# Patient Record
Sex: MALE
[De-identification: names, ages, dates, MRNs, and addresses within clinical notes are randomized per-mention and may not be internally consistent; named-entity substitution may affect disease eponyms.]

---

## 2020-10-13 PROBLEM — Z00.00 ENCOUNTER FOR PREVENTIVE HEALTH EXAMINATION: Status: ACTIVE | Noted: 2020-10-13

## 2021-01-12 ENCOUNTER — LABORATORY RESULT (OUTPATIENT)
Age: 61
End: 2021-01-12

## 2021-01-12 ENCOUNTER — APPOINTMENT (OUTPATIENT)
Dept: RHEUMATOLOGY | Facility: CLINIC | Age: 61
End: 2021-01-12
Payer: COMMERCIAL

## 2021-01-12 VITALS
TEMPERATURE: 98.2 F | SYSTOLIC BLOOD PRESSURE: 136 MMHG | RESPIRATION RATE: 16 BRPM | OXYGEN SATURATION: 96 % | HEART RATE: 72 BPM | DIASTOLIC BLOOD PRESSURE: 83 MMHG | BODY MASS INDEX: 31.98 KG/M2 | WEIGHT: 211 LBS | HEIGHT: 68 IN

## 2021-01-12 DIAGNOSIS — M25.50 PAIN IN UNSPECIFIED JOINT: ICD-10-CM

## 2021-01-12 DIAGNOSIS — Z80.9 FAMILY HISTORY OF MALIGNANT NEOPLASM, UNSPECIFIED: ICD-10-CM

## 2021-01-12 DIAGNOSIS — M77.11 LATERAL EPICONDYLITIS, RIGHT ELBOW: ICD-10-CM

## 2021-01-12 DIAGNOSIS — M54.5 LOW BACK PAIN: ICD-10-CM

## 2021-01-12 DIAGNOSIS — I10 ESSENTIAL (PRIMARY) HYPERTENSION: ICD-10-CM

## 2021-01-12 DIAGNOSIS — E78.00 PURE HYPERCHOLESTEROLEMIA, UNSPECIFIED: ICD-10-CM

## 2021-01-12 DIAGNOSIS — M77.12 LATERAL EPICONDYLITIS, RIGHT ELBOW: ICD-10-CM

## 2021-01-12 PROCEDURE — 99072 ADDL SUPL MATRL&STAF TM PHE: CPT

## 2021-01-12 PROCEDURE — 99205 OFFICE O/P NEW HI 60 MIN: CPT

## 2021-01-12 RX ORDER — CLARITHROMYCIN 500 MG/1
500 TABLET, FILM COATED ORAL
Qty: 28 | Refills: 0 | Status: ACTIVE | COMMUNITY
Start: 2020-11-12

## 2021-01-12 RX ORDER — TAMSULOSIN HYDROCHLORIDE 0.4 MG/1
0.4 CAPSULE ORAL
Qty: 30 | Refills: 0 | Status: ACTIVE | COMMUNITY
Start: 2020-12-31

## 2021-01-12 RX ORDER — ATORVASTATIN CALCIUM 10 MG/1
10 TABLET, FILM COATED ORAL
Qty: 30 | Refills: 0 | Status: ACTIVE | COMMUNITY
Start: 2021-01-09

## 2021-01-12 RX ORDER — OXYCODONE AND ACETAMINOPHEN 5; 325 MG/1; MG/1
5-325 TABLET ORAL
Qty: 8 | Refills: 0 | Status: ACTIVE | COMMUNITY
Start: 2020-07-14

## 2021-01-12 RX ORDER — AMOXICILLIN 500 MG/1
500 TABLET, FILM COATED ORAL
Qty: 56 | Refills: 0 | Status: ACTIVE | COMMUNITY
Start: 2020-11-12

## 2021-01-12 RX ORDER — ALLOPURINOL 300 MG/1
300 TABLET ORAL
Qty: 30 | Refills: 0 | Status: ACTIVE | COMMUNITY
Start: 2021-01-09

## 2021-01-12 RX ORDER — MELOXICAM 15 MG/1
15 TABLET ORAL
Qty: 30 | Refills: 0 | Status: ACTIVE | COMMUNITY
Start: 2020-08-18

## 2021-01-12 RX ORDER — POLYETHYLENE GLYCOL 3350 AND ELECTROLYTES WITH LEMON FLAVOR 236; 22.74; 6.74; 5.86; 2.97 G/4L; G/4L; G/4L; G/4L; G/4L
236 POWDER, FOR SOLUTION ORAL
Qty: 4000 | Refills: 0 | Status: ACTIVE | COMMUNITY
Start: 2020-10-05

## 2021-01-12 RX ORDER — AMLODIPINE BESYLATE 2.5 MG/1
2.5 TABLET ORAL
Qty: 30 | Refills: 0 | Status: ACTIVE | COMMUNITY
Start: 2020-12-31

## 2021-01-12 RX ORDER — ERGOCALCIFEROL 1.25 MG/1
1.25 MG CAPSULE, LIQUID FILLED ORAL
Qty: 4 | Refills: 0 | Status: ACTIVE | COMMUNITY
Start: 2020-10-07

## 2021-01-12 NOTE — PHYSICAL EXAM
[General Appearance - Alert] : alert [General Appearance - In No Acute Distress] : in no acute distress [Neck Appearance] : the appearance of the neck was normal [Auscultation Breath Sounds / Voice Sounds] : lungs were clear to auscultation bilaterally [Heart Rate And Rhythm] : heart rate was normal and rhythm regular [Edema] : there was no peripheral edema [No Spinal Tenderness] : no spinal tenderness [Motor Tone] : muscle strength and tone were normal [Musculoskeletal - Swelling] : no joint swelling seen [FreeTextEntry1] : No active tenosynovitis with appropriate shoulder abduction and lateral neck flexion. Tenderness appreciated over the anserine bursa bilaterally with mild tenderness over the medial joint lines bilaterally; external rotation of the hips maintained with tibiotalar motion is intact. [] : no rash [Oriented To Time, Place, And Person] : oriented to person, place, and time [Motor Exam] : the motor exam was normal [Impaired Insight] : insight and judgment were intact

## 2021-01-12 NOTE — ASSESSMENT
[FreeTextEntry1] : Patient presents with diffuse arthralgias along with bilateral knee pain:\par \par Patient to have inflammatory markers and serologies drawn to assess for an underlying inflammatory process lending to presentation in the setting of fatigue and joint pain.  Hand and elbow films requested. \par Patient will benefit from physical therapy to help with joint mobility and muscle strengthening.  Quadriceps strengthening exercises demonstrated in the office.  Weight loss has been encouraged to reduce load over the medial joint line.  Viscosupplementation has been encouraged to provide additional lubrication and joint support.  In the interim, Diclofenac gel has been prescribed. Knee films requested.\par He is in agreement with the above plan and will return in one months' time.\par \par \par \par

## 2021-01-12 NOTE — REVIEW OF SYSTEMS
[Arthralgias] : arthralgias [Joint Pain] : joint pain [Joint Swelling] : joint swelling [Joint Stiffness] : joint stiffness [Difficulty Walking] : difficulty walking [Fever] : no fever [Chills] : no chills [Eye Pain] : no eye pain [Sore Throat] : no sore throat [Hoarseness] : no hoarseness [Chest Pain] : no chest pain [Palpitations] : no palpitations [Cough] : no cough [SOB on Exertion] : no shortness of breath during exertion [Skin Lesions] : no skin lesions [Muscle Weakness] : no muscle weakness [Easy Bleeding] : no tendency for easy bleeding [Easy Bruising] : no tendency for easy bruising

## 2021-01-12 NOTE — HISTORY OF PRESENT ILLNESS
[Fatigue] : fatigue [Arthralgias] : arthralgias [Decreased ROM] : decreased range of motion [Morning Stiffness] : morning stiffness [FreeTextEntry1] : Patient reports diffuse arthralgias of the last 10 years. Patient notes initial pain in the elbows which subsequently migrated to the hands and knees. He reports over the last five years experiencing pain in the lower back which generally remains localized. He describes having taken meloxicam in recent months however continues with pain scaled 3/10 most times , and worse in the morning hours. He reports instability symptoms especially when climbing and descending stairs.  He denies a history of eye disturbances, rash or Raynaud's. He further denies accompanied swelling or paresthesias. He denies visual disturbances, dyspnea, chest pain, motor sensory disturbances or fevers. [Weight Loss] : no weight loss [Fever] : no fever [Chills] : no chills [Skin Lesions] : no lesions [Skin Nodules] : no skin nodules [Oral Ulcers] : no oral ulcers [Cough] : no cough [Dry Mouth] : no dry mouth [Shortness of Breath] : no shortness of breath [Chest Pain] : no chest pain [Joint Swelling] : no joint swelling [Falls] : no falls [Difficulty Standing] : no difficulty standing [Myalgias] : no myalgias [Muscle Weakness] : no muscle weakness [Muscle Spasms] : no muscle spasms [Muscle Cramping] : no muscle cramping [Eye Redness] : no eye redness [Dry Eyes] : no dry eyes

## 2021-01-13 LAB
25(OH)D3 SERPL-MCNC: 32.9 NG/ML
ALBUMIN SERPL ELPH-MCNC: 4.2 G/DL
ALP BLD-CCNC: 99 U/L
ALT SERPL-CCNC: 15 U/L
ANA PAT FLD IF-IMP: ABNORMAL
ANACR T: ABNORMAL
ANION GAP SERPL CALC-SCNC: 12 MMOL/L
APPEARANCE: CLEAR
AST SERPL-CCNC: 18 U/L
BASOPHILS # BLD AUTO: 0.05 K/UL
BASOPHILS NFR BLD AUTO: 0.7 %
BILIRUB SERPL-MCNC: 0.4 MG/DL
BILIRUBIN URINE: NEGATIVE
BLOOD URINE: ABNORMAL
BUN SERPL-MCNC: 19 MG/DL
CALCIUM SERPL-MCNC: 9.6 MG/DL
CCP AB SER IA-ACNC: 26 UNITS
CHLORIDE SERPL-SCNC: 108 MMOL/L
CHOLEST SERPL-MCNC: 128 MG/DL
CO2 SERPL-SCNC: 21 MMOL/L
COLOR: NORMAL
CREAT SERPL-MCNC: 1.29 MG/DL
CRP SERPL-MCNC: <0.1 MG/DL
EOSINOPHIL # BLD AUTO: 0.09 K/UL
EOSINOPHIL NFR BLD AUTO: 1.3 %
ERYTHROCYTE [SEDIMENTATION RATE] IN BLOOD BY WESTERGREN METHOD: 15 MM/HR
ESTIMATED AVERAGE GLUCOSE: 111 MG/DL
GLUCOSE QUALITATIVE U: NEGATIVE
GLUCOSE SERPL-MCNC: 93 MG/DL
HBA1C MFR BLD HPLC: 5.5 %
HCT VFR BLD CALC: 44.1 %
HDLC SERPL-MCNC: 39 MG/DL
HGB BLD-MCNC: 14.4 G/DL
IMM GRANULOCYTES NFR BLD AUTO: 0.1 %
KETONES URINE: NEGATIVE
LDLC SERPL CALC-MCNC: 73 MG/DL
LEUKOCYTE ESTERASE URINE: NEGATIVE
LYMPHOCYTES # BLD AUTO: 2.1 K/UL
LYMPHOCYTES NFR BLD AUTO: 30 %
MAN DIFF?: NORMAL
MCHC RBC-ENTMCNC: 30.3 PG
MCHC RBC-ENTMCNC: 32.7 GM/DL
MCV RBC AUTO: 92.8 FL
MONOCYTES # BLD AUTO: 0.37 K/UL
MONOCYTES NFR BLD AUTO: 5.3 %
NEUTROPHILS # BLD AUTO: 4.37 K/UL
NEUTROPHILS NFR BLD AUTO: 62.6 %
NITRITE URINE: NEGATIVE
NONHDLC SERPL-MCNC: 88 MG/DL
PH URINE: 6
PLATELET # BLD AUTO: 275 K/UL
POTASSIUM SERPL-SCNC: 4.1 MMOL/L
PROT SERPL-MCNC: 7.1 G/DL
PROTEIN URINE: ABNORMAL
RBC # BLD: 4.75 M/UL
RBC # FLD: 13.6 %
RF+CCP IGG SER-IMP: ABNORMAL
RHEUMATOID FACT SER QL: <10 IU/ML
SARS-COV-2 IGG SERPL IA-ACNC: 0.08 INDEX
SARS-COV-2 IGG SERPL QL IA: NEGATIVE
SODIUM SERPL-SCNC: 142 MMOL/L
SPECIFIC GRAVITY URINE: 1.02
TRIGL SERPL-MCNC: 79 MG/DL
TSH SERPL-ACNC: 0.78 UIU/ML
URATE SERPL-MCNC: 5.5 MG/DL
UROBILINOGEN URINE: NORMAL
WBC # FLD AUTO: 6.99 K/UL

## 2021-01-13 RX ORDER — DICLOFENAC SODIUM 1% 10 MG/G
1 GEL TOPICAL
Qty: 100 | Refills: 2 | Status: ACTIVE | COMMUNITY
Start: 2021-01-13 | End: 1900-01-01

## 2021-01-22 LAB — HLA-B27 RELATED AG QL: NEGATIVE

## 2021-02-02 ENCOUNTER — APPOINTMENT (OUTPATIENT)
Dept: RHEUMATOLOGY | Facility: CLINIC | Age: 61
End: 2021-02-02
Payer: COMMERCIAL

## 2021-02-02 DIAGNOSIS — R06.00 DYSPNEA, UNSPECIFIED: ICD-10-CM

## 2021-02-02 DIAGNOSIS — R53.83 OTHER FATIGUE: ICD-10-CM

## 2021-02-02 DIAGNOSIS — M17.10 UNILATERAL PRIMARY OSTEOARTHRITIS, UNSPECIFIED KNEE: ICD-10-CM

## 2021-02-02 DIAGNOSIS — Z87.891 PERSONAL HISTORY OF NICOTINE DEPENDENCE: ICD-10-CM

## 2021-02-02 DIAGNOSIS — R76.8 OTHER SPECIFIED ABNORMAL IMMUNOLOGICAL FINDINGS IN SERUM: ICD-10-CM

## 2021-02-02 DIAGNOSIS — M17.0 BILATERAL PRIMARY OSTEOARTHRITIS OF KNEE: ICD-10-CM

## 2021-02-02 PROCEDURE — 99442: CPT

## 2021-02-02 RX ORDER — DICLOFENAC SODIUM 1% 10 MG/G
1 GEL TOPICAL
Qty: 100 | Refills: 2 | Status: ACTIVE | COMMUNITY
Start: 2021-02-02 | End: 1900-01-01

## 2021-07-09 ENCOUNTER — RESULT REVIEW (OUTPATIENT)
Age: 61
End: 2021-07-09

## 2024-04-08 ENCOUNTER — APPOINTMENT (OUTPATIENT)
Dept: UROLOGY | Facility: CLINIC | Age: 64
End: 2024-04-08
Payer: MEDICARE

## 2024-04-15 ENCOUNTER — APPOINTMENT (OUTPATIENT)
Dept: UROLOGY | Facility: CLINIC | Age: 64
End: 2024-04-15
Payer: MEDICARE

## 2024-04-15 VITALS
HEART RATE: 73 BPM | TEMPERATURE: 98.9 F | RESPIRATION RATE: 16 BRPM | HEIGHT: 68 IN | WEIGHT: 211 LBS | OXYGEN SATURATION: 96 % | DIASTOLIC BLOOD PRESSURE: 83 MMHG | SYSTOLIC BLOOD PRESSURE: 144 MMHG | BODY MASS INDEX: 31.98 KG/M2

## 2024-04-15 DIAGNOSIS — N50.0 ATROPHY OF TESTIS: ICD-10-CM

## 2024-04-15 DIAGNOSIS — N50.82 SCROTAL PAIN: ICD-10-CM

## 2024-04-15 PROCEDURE — G2211 COMPLEX E/M VISIT ADD ON: CPT

## 2024-04-15 PROCEDURE — 99204 OFFICE O/P NEW MOD 45 MIN: CPT

## 2024-04-15 NOTE — HISTORY OF PRESENT ILLNESS
[FreeTextEntry1] : DAVID VALENCIA Apr 30 1960   Language: English Date of First visit: 04/15/2024 Accompanied by: self Contact info: Referring Provider/PCP: Dr. Junior Fax: [phone]   CC/ Problem List:  scrotal pain  =============================================================================== FIRST VISIT / Summary: Very pleasant 63 year old M here for scrotal pain. initially in right now both for past few months possibly 6 months. Has not tried any OTC medications. Was seen by previous urologist who always tells him "don't worry" and no interventions done. Drinks 6 glasses water daily  Sometimes constipation managed with tea. Denies hematuria, dysuria, discharge or lumps IPSS 21 (incomplete emptying, intermittency, weak stream- 5) QOL 3 ------------------------------------------------------------------------------------------- INTERVAL VISITS:   ===============================================================================   PMH: BPH, gout, HTN, HLD Meds: tamsulosin, allopurinol, losartan, atorvastatin  All: denies  FHx: unknown  malignancies  SocHx: former smoker quit 2019 smoked 7 cigsx20 years, occasional Etoh, , 2 children, retired    PSH: denies    ROS: Review of Systems is as per HPI unless otherwise denoted below   =============================================================================== DATA: LABS (SELECTED):---------------------------------------------------------------------------------------------------     RADS:-------------------------------------------------------------------------------------------------------------------     PATHOLOGY/CYTOLOGY:-------------------------------------------------------------------------------------------     VOIDING STUDIES: ----------------------------------------------------------------------------------------------------  04/15/2024 PVR 0   STONE STUDIES: (Analysis/LLSA)----------------------------------------------------------------------------------     PROCEDURES: -----------------------------------------------------------------------------------------------       =============================================================================== PHYSICAL EXAM:    FOCUSED: ----------------------------------------------------------------------------------------------------------------  Date: 04/15/2024 Chaperone: offered and patient declined   Penis: No lesions, tenderness, curvature, plaques. Meatus: normal caliber Testes: Descended bilaterally. Non tender, no palpable masses. Small and soft, approx 5-10cc Epididymi: No palpable epididymal masses. b/l palpable vas. Scrotum: Scrotal wall normal. No spermatic cord mass. No palpable hydroceles    ======================================================================================= DISCUSSION: ======================================================================================= ASSESSMENT and PLAN   1. 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 Should you have any questions please do not hesitate to reach out to me.     Jeison Ahumada MD                                                         [facility] Physician [facility]   [place] Office: [address], [address] [place] [address] T: [phone] F: [phone]   [place] Office: [address], [address]st [place] [address] T: [phone] F: [phone]

## 2024-04-18 DIAGNOSIS — R31.29 OTHER MICROSCOPIC HEMATURIA: ICD-10-CM

## 2024-04-18 LAB
APPEARANCE: CLEAR
BACTERIA UR CULT: NORMAL
BACTERIA: NEGATIVE /HPF
BILIRUBIN URINE: NEGATIVE
BLOOD URINE: ABNORMAL
C TRACH RRNA SPEC QL NAA+PROBE: NOT DETECTED
CAST: 0 /LPF
COLOR: YELLOW
EPITHELIAL CELLS: 0 /HPF
GLUCOSE QUALITATIVE U: NEGATIVE MG/DL
KETONES URINE: NEGATIVE MG/DL
LEUKOCYTE ESTERASE URINE: NEGATIVE
MICROSCOPIC-UA: NORMAL
N GONORRHOEA RRNA SPEC QL NAA+PROBE: NOT DETECTED
NITRITE URINE: NEGATIVE
PH URINE: 5.5
PROTEIN URINE: 100 MG/DL
RED BLOOD CELLS URINE: 6 /HPF
SOURCE AMPLIFICATION: NORMAL
SPECIFIC GRAVITY URINE: 1.02
UROBILINOGEN URINE: 0.2 MG/DL
WHITE BLOOD CELLS URINE: 0 /HPF

## 2024-04-24 LAB
TESTOST FREE SERPL-MCNC: 8.5 PG/ML
TESTOST SERPL-MCNC: 348 NG/DL

## 2024-04-25 ENCOUNTER — NON-APPOINTMENT (OUTPATIENT)
Age: 64
End: 2024-04-25

## 2024-05-20 ENCOUNTER — APPOINTMENT (OUTPATIENT)
Dept: UROLOGY | Facility: CLINIC | Age: 64
End: 2024-05-20
Payer: MEDICARE

## 2024-05-20 VITALS
BODY MASS INDEX: 31.98 KG/M2 | OXYGEN SATURATION: 98 % | WEIGHT: 211 LBS | HEART RATE: 77 BPM | DIASTOLIC BLOOD PRESSURE: 77 MMHG | RESPIRATION RATE: 16 BRPM | SYSTOLIC BLOOD PRESSURE: 119 MMHG | TEMPERATURE: 97.2 F | HEIGHT: 68 IN

## 2024-05-20 PROCEDURE — G2211 COMPLEX E/M VISIT ADD ON: CPT

## 2024-05-20 PROCEDURE — 99214 OFFICE O/P EST MOD 30 MIN: CPT

## 2024-05-21 NOTE — HISTORY OF PRESENT ILLNESS
[FreeTextEntry1] : DAVID VALENCIA Apr 30 1960  Language: English Date of First visit: 04/15/2024 Accompanied by: self Contact info: Referring Provider/PCP: Dr. Junior Fax: [phone]  CC/ Problem List: scrotal pain microhematuria =============================================================================== FIRST VISIT / Summary: Very pleasant 63 year old M here for scrotal pain. initially in right now both for past few months possibly 6 months. Has not tried any OTC medications. Was seen by previous urologist who always tells him "don't worry" and no interventions done. Drinks 6 glasses water daily Sometimes constipation managed with tea. Denies hematuria, dysuria, discharge or lumps IPSS 21 (incomplete emptying, intermittency, weak stream- 5) QOL 3 ------------------------------------------------------------------------------------------- INTERVAL VISITS: The patient's medications and allergies were reviewed and edited below. Dated  05/20/2024  He declined cystoscopy. Discussed results, plan below ===============================================================================  PMH: BPH, gout, HTN, HLD Meds: tamsulosin, allopurinol, losartan, atorvastatin All: denies FHx: unknown  malignancies SocHx: former smoker quit 2019 smoked 7 cigsx20 years, occasional Etoh, , 2 children, retired  PSH: giulianajessy  ROS: Review of Systems is as per HPI unless otherwise denoted below  =============================================================================== DATA: LABS (SELECTED):--------------------------------------------------------------------------------------------------- 4/15/24: UA/cx negative 4/22/24: T total 348 free 8.5  RADS:------------------------------------------------------------------------------------------------------------------- 4/25/2024: US scrotum. Bilateral atrophic testes 4/30/2024: Urogram.  MSR: no stones, hydronephrosis, or urinary system abnormalities. Prostate 5x4.9x5.1 approx 66cc  PATHOLOGY/CYTOLOGY:-------------------------------------------------------------------------------------------   VOIDING STUDIES: ---------------------------------------------------------------------------------------------------- 04/15/2024 PVR 0  STONE STUDIES: (Analysis/LLSA)----------------------------------------------------------------------------------   PROCEDURES: ----------------------------------------------------------------------------------------------- ~2019 cystoscopy he reports was normal.   =============================================================================== PHYSICAL EXAM:   FOCUSED: ---------------------------------------------------------------------------------------------------------------- Date: 04/15/2024 Chaperone: offered and patient declined  Penis: No lesions, tenderness, curvature, plaques. Meatus: normal caliber Testes: Descended bilaterally. Non tender, no palpable masses. Small and soft, approx 5-10cc Epididymi: No palpable epididymal masses. b/l palpable vas. Scrotum: Scrotal wall normal. No spermatic cord mass. No palpable hydroceles  ======================================================================================= DISCUSSION: ======================================================================================= ASSESSMENT and PLAN  1. scrotal pain -UA/UCx/GC -if labs WNL advised for Ibuprofen x 1 week  2 BPH -continue Tamsulosin to 0.8 mg daily BID -Uroflow at next visit -BPH info packet provided -no water 1-2 hrs before bedtime  3. Microhematuria - CT normal - recommend cysto - he declines accepting risk of missed diagnosis (had before pandemic was painful)  4. 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 Should you have any questions please do not hesitate to reach out to me.   MD [facility] Physician [facility]  [place] Office: [address] [facility], [address] [place] [address] T: [phone] F: [phone]  [place] Office: [address] [place] [address], 1st [place] [address] T: [phone] F: [phone]

## 2024-09-06 ENCOUNTER — APPOINTMENT (OUTPATIENT)
Dept: UROLOGY | Facility: CLINIC | Age: 64
End: 2024-09-06
Payer: MEDICARE

## 2024-09-06 VITALS
RESPIRATION RATE: 16 BRPM | BODY MASS INDEX: 31.52 KG/M2 | TEMPERATURE: 98 F | OXYGEN SATURATION: 97 % | DIASTOLIC BLOOD PRESSURE: 73 MMHG | WEIGHT: 208 LBS | HEART RATE: 70 BPM | HEIGHT: 68 IN | SYSTOLIC BLOOD PRESSURE: 116 MMHG

## 2024-09-06 DIAGNOSIS — N50.0 ATROPHY OF TESTIS: ICD-10-CM

## 2024-09-06 DIAGNOSIS — R39.9 UNSPECIFIED SYMPTOMS AND SIGNS INVOLVING THE GENITOURINARY SYSTEM: ICD-10-CM

## 2024-09-06 DIAGNOSIS — N50.82 SCROTAL PAIN: ICD-10-CM

## 2024-09-06 PROCEDURE — G2211 COMPLEX E/M VISIT ADD ON: CPT

## 2024-09-06 PROCEDURE — 99214 OFFICE O/P EST MOD 30 MIN: CPT

## 2024-09-08 PROBLEM — R39.9 LOWER URINARY TRACT SYMPTOMS (LUTS): Status: ACTIVE | Noted: 2024-09-08

## 2024-09-08 NOTE — HISTORY OF PRESENT ILLNESS
[FreeTextEntry1] : DAVID VALENCIA Apr 30 1960  Language: English Date of First visit: 04/15/2024 Accompanied by: self Contact info: Referring Provider/PCP: Dr. Junior Fax: [phone]  CC/ Problem List: scrotal pain microhematuria LUTS =============================================================================== FIRST VISIT / Summary: Very pleasant 63 year old M here for scrotal pain. initially in right now both for past few months possibly 6 months. Has not tried any OTC medications. Was seen by previous urologist who always tells him "don't worry" and no interventions done. Drinks 6 glasses water daily Sometimes constipation managed with tea. Denies hematuria, dysuria, discharge or lumps IPSS 21 (incomplete emptying, intermittency, weak stream- 5) QOL 3 ------------------------------------------------------------------------------------------- INTERVAL VISITS: The patient's medications and allergies were reviewed and edited below. Dated  09/06/2024  here for uroflow. Taking tamsulosin twice daily. Nocturia x2 but not bothersome. Satisfied with medication  ===============================================================================  PMH: BPH, gout, HTN, HLD Meds: tamsulosin, allopurinol, losartan, atorvastatin All: denies FHx: unknown  malignancies SocHx: former smoker quit 2019 smoked 7 cigsx20 years, occasional Etoh, , 2 children, retired  PSH: denies  ROS: Review of Systems is as per HPI unless otherwise denoted below  =============================================================================== DATA: LABS (SELECTED):--------------------------------------------------------------------------------------------------- 4/15/24: UA/cx negative 4/22/24: T total 348 free 8.5  RADS:------------------------------------------------------------------------------------------------------------------- 4/25/2024: US scrotum. Bilateral atrophic testes 4/30/2024: Urogram. MSR: no stones, hydronephrosis, or urinary system abnormalities. Prostate 5x4.9x5.1 approx 66cc  PATHOLOGY/CYTOLOGY:-------------------------------------------------------------------------------------------   VOIDING STUDIES: ---------------------------------------------------------------------------------------------------- 04/15/2024 PVR 0 09/06/2024 Uroflow with peak flow 15.1 and volume 289cc  STONE STUDIES: (Analysis/LLSA)----------------------------------------------------------------------------------   PROCEDURES: ----------------------------------------------------------------------------------------------- ~2019 cystoscopy he reports was normal.  =============================================================================== PHYSICAL EXAM:   FOCUSED: ---------------------------------------------------------------------------------------------------------------- Date: 09/06/2024  Chaperone: offered and patient declined  Penis: No lesions, tenderness, curvature, plaques. Meatus: normal caliber Testes: Descended bilaterally. Non tender, no palpable masses. Small and soft, approx 5-10cc Epididymi: No palpable epididymal masses. b/l palpable vas. Scrotum: Scrotal wall normal. No spermatic cord mass. No palpable hydroceles  ======================================================================================= DISCUSSION: ======================================================================================= ASSESSMENT and PLAN  1. scrotal pain / atrophic testes -UA/UCx/GC negative. T is normal 2024 -Ibuprofen x 1 week  2 BPH -continue Tamsulosin to 0.8 mg daily BID -Uroflow good -BPH info packet provided -no water 1-2 hrs before bedtime - RTC in 1 year  3. Microhematuria - CT normal - recommend cysto - he declines accepting risk of missed diagnosis (had before pandemic was painful experience)  ======================================================================================= 4g Thank you for allowing me to assist in the care of your patient. Should you have any questions please do not hesitate to reach out to me.   Jeison Ahumada MD [facility] Physician [facility] for [facility]  [place] Office: [address], [address] [place] [address] T: [phone] F: [phone]  [place] Office: [address] [place] [address], 1st [place] [address] T: [phone] F: [phone]

## 2024-09-08 NOTE — HISTORY OF PRESENT ILLNESS
[FreeTextEntry1] : DAVID VALENCIA Apr 30 1960  Language: English Date of First visit: 04/15/2024 Accompanied by: self Contact info: Referring Provider/PCP: Dr. Junior Fax: [phone]  CC/ Problem List: scrotal pain microhematuria LUTS =============================================================================== FIRST VISIT / Summary: Very pleasant 63 year old M here for scrotal pain. initially in right now both for past few months possibly 6 months. Has not tried any OTC medications. Was seen by previous urologist who always tells him "don't worry" and no interventions done. Drinks 6 glasses water daily Sometimes constipation managed with tea. Denies hematuria, dysuria, discharge or lumps IPSS 21 (incomplete emptying, intermittency, weak stream- 5) QOL 3 ------------------------------------------------------------------------------------------- INTERVAL VISITS: The patient's medications and allergies were reviewed and edited below. Dated  09/06/2024  here for uroflow. Taking tamsulosin twice daily. Nocturia x2 but not bothersome. Satisfied with medication  ===============================================================================  PMH: BPH, gout, HTN, HLD Meds: tamsulosin, allopurinol, losartan, atorvastatin All: denies FHx: unknown  malignancies SocHx: former smoker quit 2019 smoked 7 cigsx20 years, occasional Etoh, , 2 children, retired  PSH: denies  ROS: Review of Systems is as per HPI unless otherwise denoted below  =============================================================================== DATA: LABS (SELECTED):--------------------------------------------------------------------------------------------------- 4/15/24: UA/cx negative 4/22/24: T total 348 free 8.5  RADS:------------------------------------------------------------------------------------------------------------------- 4/25/2024: US scrotum. Bilateral atrophic testes 4/30/2024: Urogram. MSR: no stones, hydronephrosis, or urinary system abnormalities. Prostate 5x4.9x5.1 approx 66cc  PATHOLOGY/CYTOLOGY:-------------------------------------------------------------------------------------------   VOIDING STUDIES: ---------------------------------------------------------------------------------------------------- 04/15/2024 PVR 0 09/06/2024 Uroflow with peak flow 15.1 and volume 289cc  STONE STUDIES: (Analysis/LLSA)----------------------------------------------------------------------------------   PROCEDURES: ----------------------------------------------------------------------------------------------- ~2019 cystoscopy he reports was normal.  =============================================================================== PHYSICAL EXAM:   FOCUSED: ---------------------------------------------------------------------------------------------------------------- Date: 09/06/2024  Chaperone: offered and patient declined  Penis: No lesions, tenderness, curvature, plaques. Meatus: normal caliber Testes: Descended bilaterally. Non tender, no palpable masses. Small and soft, approx 5-10cc Epididymi: No palpable epididymal masses. b/l palpable vas. Scrotum: Scrotal wall normal. No spermatic cord mass. No palpable hydroceles  ======================================================================================= DISCUSSION: ======================================================================================= ASSESSMENT and PLAN  1. scrotal pain / atrophic testes -UA/UCx/GC negative. T is normal 2024 -Ibuprofen x 1 week  2 BPH -continue Tamsulosin to 0.8 mg daily BID -Uroflow good -BPH info packet provided -no water 1-2 hrs before bedtime - RTC in 1 year  3. Microhematuria - CT normal - recommend cysto - he declines accepting risk of missed diagnosis (had before pandemic was painful experience)  ======================================================================================= 4g Thank you for allowing me to assist in the care of your patient. Should you have any questions please do not hesitate to reach out to me.   Jeison Ahumada MD [facility] Physician [facility] for U[facility]  [place] Office: [address] [facility], [address] [place] [address] T: [phone] F: [phone]  [place] Office: [address] [place] [address], 1st [place] [address] T: [phone] F: [phone]

## 2024-09-08 NOTE — HISTORY OF PRESENT ILLNESS
[FreeTextEntry1] : DAVID VALENCIA Apr 30 1960  Language: English Date of First visit: 04/15/2024 Accompanied by: self Contact info: Referring Provider/PCP: Dr. Junior Fax: [phone]  CC/ Problem List: scrotal pain microhematuria LUTS =============================================================================== FIRST VISIT / Summary: Very pleasant 63 year old M here for scrotal pain. initially in right now both for past few months possibly 6 months. Has not tried any OTC medications. Was seen by previous urologist who always tells him "don't worry" and no interventions done. Drinks 6 glasses water daily Sometimes constipation managed with tea. Denies hematuria, dysuria, discharge or lumps IPSS 21 (incomplete emptying, intermittency, weak stream- 5) QOL 3 ------------------------------------------------------------------------------------------- INTERVAL VISITS: The patient's medications and allergies were reviewed and edited below. Dated  09/06/2024  here for uroflow. Taking tamsulosin twice daily. Nocturia x2 but not bothersome. Satisfied with medication  ===============================================================================  PMH: BPH, gout, HTN, HLD Meds: tamsulosin, allopurinol, losartan, atorvastatin All: denies FHx: unknown  malignancies SocHx: former smoker quit 2019 smoked 7 cigsx20 years, occasional Etoh, , 2 children, retired  PSH: denies  ROS: Review of Systems is as per HPI unless otherwise denoted below  =============================================================================== DATA: LABS (SELECTED):--------------------------------------------------------------------------------------------------- 4/15/24: UA/cx negative 4/22/24: T total 348 free 8.5  RADS:------------------------------------------------------------------------------------------------------------------- 4/25/2024: US scrotum. Bilateral atrophic testes 4/30/2024: Urogram. MSR: no stones, hydronephrosis, or urinary system abnormalities. Prostate 5x4.9x5.1 approx 66cc  PATHOLOGY/CYTOLOGY:-------------------------------------------------------------------------------------------   VOIDING STUDIES: ---------------------------------------------------------------------------------------------------- 04/15/2024 PVR 0 09/06/2024 Uroflow with peak flow 15.1 and volume 289cc  STONE STUDIES: (Analysis/LLSA)----------------------------------------------------------------------------------   PROCEDURES: ----------------------------------------------------------------------------------------------- ~2019 cystoscopy he reports was normal.  =============================================================================== PHYSICAL EXAM:   FOCUSED: ---------------------------------------------------------------------------------------------------------------- Date: 09/06/2024  Chaperone: offered and patient declined  Penis: No lesions, tenderness, curvature, plaques. Meatus: normal caliber Testes: Descended bilaterally. Non tender, no palpable masses. Small and soft, approx 5-10cc Epididymi: No palpable epididymal masses. b/l palpable vas. Scrotum: Scrotal wall normal. No spermatic cord mass. No palpable hydroceles  ======================================================================================= DISCUSSION: ======================================================================================= ASSESSMENT and PLAN  1. scrotal pain / atrophic testes -UA/UCx/GC negative. T is normal 2024 -Ibuprofen x 1 week  2 BPH -continue Tamsulosin to 0.8 mg daily BID -Uroflow good -BPH info packet provided -no water 1-2 hrs before bedtime - RTC in 1 year  3. Microhematuria - CT normal - recommend cysto - he declines accepting risk of missed diagnosis (had before pandemic was painful experience)  ======================================================================================= 4g Thank you for allowing me to assist in the care of your patient. Should you have any questions please do not hesitate to reach out to me.   Jeison Ahumada MD [facility] Physician [facility]  [place] Office: [address], [address] [place] [address] T: [phone] F: [phone]  [place] Office: [address] [place] [address], [address] [place] [address] T: [phone] F: [phone]

## 2025-09-05 ENCOUNTER — APPOINTMENT (OUTPATIENT)
Dept: UROLOGY | Facility: CLINIC | Age: 65
End: 2025-09-05
Payer: MEDICARE

## 2025-09-05 VITALS
WEIGHT: 208 LBS | BODY MASS INDEX: 31.52 KG/M2 | TEMPERATURE: 97.8 F | HEART RATE: 65 BPM | DIASTOLIC BLOOD PRESSURE: 81 MMHG | RESPIRATION RATE: 15 BRPM | OXYGEN SATURATION: 95 % | HEIGHT: 68 IN | SYSTOLIC BLOOD PRESSURE: 120 MMHG

## 2025-09-05 DIAGNOSIS — Z12.5 ENCOUNTER FOR SCREENING FOR MALIGNANT NEOPLASM OF PROSTATE: ICD-10-CM

## 2025-09-05 PROCEDURE — G2211 COMPLEX E/M VISIT ADD ON: CPT

## 2025-09-05 PROCEDURE — 99214 OFFICE O/P EST MOD 30 MIN: CPT

## 2025-09-06 LAB
PSA FREE FLD-MCNC: 27 %
PSA FREE SERPL-MCNC: 0.79 NG/ML
PSA SERPL-MCNC: 2.98 NG/ML